# Patient Record
Sex: FEMALE | Race: WHITE | NOT HISPANIC OR LATINO | Employment: UNEMPLOYED | URBAN - METROPOLITAN AREA
[De-identification: names, ages, dates, MRNs, and addresses within clinical notes are randomized per-mention and may not be internally consistent; named-entity substitution may affect disease eponyms.]

---

## 2017-03-31 ENCOUNTER — OFFICE VISIT (OUTPATIENT)
Dept: URGENT CARE | Facility: PHYSICIAN GROUP | Age: 37
End: 2017-03-31
Payer: COMMERCIAL

## 2017-03-31 VITALS
RESPIRATION RATE: 20 BRPM | HEART RATE: 101 BPM | SYSTOLIC BLOOD PRESSURE: 130 MMHG | WEIGHT: 293 LBS | TEMPERATURE: 98.2 F | BODY MASS INDEX: 48.82 KG/M2 | DIASTOLIC BLOOD PRESSURE: 94 MMHG | OXYGEN SATURATION: 94 % | HEIGHT: 65 IN

## 2017-03-31 DIAGNOSIS — S16.1XXA NECK MUSCLE STRAIN, INITIAL ENCOUNTER: ICD-10-CM

## 2017-03-31 DIAGNOSIS — R09.81 NASAL SINUS CONGESTION: ICD-10-CM

## 2017-03-31 DIAGNOSIS — G44.209 ACUTE NON INTRACTABLE TENSION-TYPE HEADACHE: ICD-10-CM

## 2017-03-31 PROCEDURE — 99214 OFFICE O/P EST MOD 30 MIN: CPT | Performed by: NURSE PRACTITIONER

## 2017-03-31 RX ORDER — TIZANIDINE 4 MG/1
4 TABLET ORAL NIGHTLY PRN
Qty: 7 TAB | Refills: 0 | Status: SHIPPED | OUTPATIENT
Start: 2017-03-31 | End: 2019-03-01

## 2017-03-31 RX ORDER — KETOROLAC TROMETHAMINE 30 MG/ML
60 INJECTION, SOLUTION INTRAMUSCULAR; INTRAVENOUS ONCE
Status: COMPLETED | OUTPATIENT
Start: 2017-03-31 | End: 2017-03-31

## 2017-03-31 RX ORDER — FLUTICASONE PROPIONATE 50 MCG
2 SPRAY, SUSPENSION (ML) NASAL DAILY
Qty: 1 BOTTLE | Refills: 0 | Status: SHIPPED | OUTPATIENT
Start: 2017-03-31 | End: 2019-03-01

## 2017-03-31 RX ORDER — IBUPROFEN 600 MG/1
600 TABLET ORAL EVERY 6 HOURS PRN
Qty: 30 TAB | Refills: 0 | Status: SHIPPED | OUTPATIENT
Start: 2017-03-31

## 2017-03-31 RX ADMIN — KETOROLAC TROMETHAMINE 60 MG: 30 INJECTION, SOLUTION INTRAMUSCULAR; INTRAVENOUS at 13:55

## 2017-03-31 ASSESSMENT — ENCOUNTER SYMPTOMS
EYE REDNESS: 0
COUGH: 0
VOMITING: 0
CONSTIPATION: 0
ABDOMINAL PAIN: 0
BACK PAIN: 0
SORE THROAT: 0
HEADACHES: 1
SHORTNESS OF BREATH: 0
WEAKNESS: 0
EYE PAIN: 0
DIZZINESS: 1
TINGLING: 0
BLURRED VISION: 0
ORTHOPNEA: 0
NECK PAIN: 1
FOCAL WEAKNESS: 0
EYE DISCHARGE: 0
SENSORY CHANGE: 0
MYALGIAS: 1
PHOTOPHOBIA: 1
CHILLS: 0
NAUSEA: 1
SEIZURES: 0
DOUBLE VISION: 0
FALLS: 0
FEVER: 1
SPEECH CHANGE: 0
DIARRHEA: 0

## 2017-03-31 NOTE — MR AVS SNAPSHOT
"        Margy Bhagat   3/31/2017 1:00 PM   Office Visit   MRN: [id number]    Department:  [facility]   Dept Phone:  [phone]    Description:  Female : 1980   Provider:  LETI Kirkpatrick           Reason for Visit     Headache fever, loss of appetite,Pain above eyes, 4 days       Allergies as of 3/31/2017     No Known Allergies      You were diagnosed with     Neck muscle strain, initial encounter   [792810]       Acute non intractable tension-type headache   [0333302]       Nasal sinus congestion   [298881]         Vital Signs     Blood Pressure Pulse Temperature Respirations Height Weight    130/94 mmHg 101 36.8 °C (98.2 °F) 20 1.651 m (5' 5\") 141.522 kg (312 lb)    Body Mass Index Oxygen Saturation Smoking Status             51.92 kg/m2 94% Former Smoker         Basic Information     Date Of Birth Sex Race Ethnicity Preferred Language    1980 Female White Non- English      Problem List              ICD-10-CM Priority Class Noted - Resolved    Pain R52   2015 - Present    Edema of leg R60.0   2015 - Present    Morbid obesity with BMI of 45.0-49.9, adult (CMS-HCC) E66.01, Z68.42   2015 - Present    History of detached retina repair Z98.890, Z86.69   2015 - Present      Health Maintenance        Date Due Completion Dates    PAP SMEAR 2000 ---    IMM INFLUENZA (1) 2016 ---    IMM DTaP/Tdap/Td Vaccine (2 - Td) 2025            Current Immunizations     Tdap Vaccine 2015      Below and/or attached are the medications your provider expects you to take. Review all of your home medications and newly ordered medications with your provider and/or pharmacist. Follow medication instructions as directed by your provider and/or pharmacist. Please keep your medication list with you and share with your provider. Update the information when medications are discontinued, doses are changed, or new medications (including over-the-counter " products) are added; and carry medication information at all times in the event of emergency situations     Allergies:  No Known Allergies          Medications  Valid as of: March 31, 2017 -  1:55 PM    Generic Name Brand Name Tablet Size Instructions for use    Clobetasol Propionate (Ointment) TEMOVATE 0.05 % Apply 1 Application to affected area(s) 2 times a day.        Fluticasone Propionate (Suspension) FLONASE 50 MCG/ACT Spray 2 Sprays in nose every day.        Green Tea (Camillia sinensis)   Take  by mouth.        Ibuprofen   Take 500 mg by mouth.        Ibuprofen (Tab) MOTRIN 600 MG Take 1 Tab by mouth every 6 hours as needed.        Multiple Vitamins-Minerals   Take  by mouth.        TiZANidine HCl (Tab) ZANAFLEX 4 MG Take 1 Tab by mouth at bedtime as needed. Causes drowsiness, do not drive or work while using        .                 Medicines prescribed today were sent to:     [facility] [address] [place] [address], [place] - [address] [address]    Phone: [phone] Fax: [phone]    Open 24 Hours?: No      Medication refill instructions:       If your prescription bottle indicates you have medication refills left, it is not necessary to call your provider’s office. Please contact your pharmacy and they will refill your medication.    If your prescription bottle indicates you do not have any refills left, you may request refills at any time through one of the following ways: The online [employer] system (except Urgent Care), by calling your provider’s office, or by asking your pharmacy to contact your provider’s office with a refill request. Medication refills are processed only during regular business hours and may not be available until the next business day. Your provider may request additional information or to have a follow-up visit with you prior to refilling your medication.   *Please Note: Medication refills are assigned a new Rx number when refilled electronically.  Your pharmacy may indicate that no refills were authorized even though a new prescription for the same medication is available at the pharmacy. Please request the medicine by name with the pharmacy before contacting your provider for a refill.           Long Play Access Code: [email or web address]  Expires: 4/30/2017  1:55 PM    Long Play  A secure, online tool to manage your health information     [employer]’s Long Play® is a secure, online tool that connects you to your personalized health information from the privacy of your home -- day or night - making it very easy for you to manage your healthcare. Once the activation process is completed, you can even access your medical information using the Long Play tone, which is available for free in the Apple Tone store or Google Play store.     Long Play provides the following levels of access (as shown below):   My Chart Features   [facility]  Specialists [facility]  Urgent  Care [facility]  Primary Care  Doctor   Email your healthcare team securely and privately 24/7 X X X    Manage appointments: schedule your next appointment; view details of past/upcoming appointments X      Request prescription refills. X      View recent personal medical records, including lab and immunizations X X X X   View health record, including health history, allergies, medications X X X X   Read reports about your outpatient visits, procedures, consult and ER notes X X X X   See your discharge summary, which is a recap of your hospital and/or ER visit that includes your diagnosis, lab results, and care plan. X X       How to register for Long Play:  1. Go to  [email or web address].  2. Click on the Sign Up Now box, which takes you to the New Member Sign Up page. You will need to provide the following information:  a. Enter your Long Play Access Code exactly as it appears at the top of this page. (You will not need to use this code after you’ve completed the sign-up process. If you  do not sign up before the expiration date, you must request a new code.)   b. Enter your date of birth.   c. Enter your home email address.   d. Click Submit, and follow the next screen’s instructions.  3. Create a Tripl ID. This will be your Tripl login ID and cannot be changed, so think of one that is secure and easy to remember.  4. Create a Tripl password. You can change your password at any time.  5. Enter your Password Reset Question and Answer. This can be used at a later time if you forget your password.   6. Enter your e-mail address. This allows you to receive e-mail notifications when new information is available in Tripl.  7. Click Sign Up. You can now view your health information.    For assistance activating your Tripl account, call [phone]

## 2017-03-31 NOTE — PROGRESS NOTES
"Subjective:      Margy Bhagat is a 36 y.o. female who presents with Headache            Headache   Associated symptoms include dizziness, ear pain, a fever, nausea, neck pain and photophobia. Pertinent negatives include no abdominal pain, back pain, blurred vision, coughing, eye pain, eye redness, seizures, sore throat, tingling, vomiting or weakness.   Margy is a 36 year old female who is here for headache and neck pain. C/o pain with light sensitivity and nausea with HA. Denies migraines, seizures, changes in meds/foods. States had URI symptoms last week. Took Tylenol 2 tabs last night. Fever 101.3 degrees, last night; 99.2 degrees this AM. Feels slightly \"out of balance\". Denies confusion, trauma, injury or fall. Decreased appetite. Admits to poor water drinking. Ex-smoker. Fatigue. Clark starts at base of neck and ends behind eyes/temples. 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    PMH:  has a past medical history of Detached retina.  MEDS:   Current outpatient prescriptions:   •  IBUPROFEN PO, Take 500 mg by mouth., Disp: , Rfl:   •  fluticasone (FLONASE) 50 MCG/ACT nasal spray, Spray 2 Sprays in nose every day., Disp: 1 Bottle, Rfl: 0  •  tizanidine (ZANAFLEX) 4 MG Tab, Take 1 Tab by mouth at bedtime as needed. Causes drowsiness, do not drive or work while using, Disp: 7 Tab, Rfl: 0  •  ibuprofen (MOTRIN) 600 MG Tab, Take 1 Tab by mouth every 6 hours as needed., Disp: 30 Tab, Rfl: 0  •  Multiple Vitamins-Minerals (MULTIVITAMIN PO), Take  by mouth., Disp: , Rfl:   •  Green Tea, Camillia sinensis, (GREEN TEA EXTRACT PO), Take  by mouth., Disp: , Rfl:   •  clobetasol (TEMOVATE) 0.05 % OINT, Apply 1 Application to affected area(s) 2 times a day., Disp: 60 g, Rfl: 0  ALLERGIES: No Known Allergies  SURGHX:   Past Surgical History   Procedure Laterality Date   • Eye surgery       detatched retina   • Other       E Sure procedure     SOCHX:  reports that she quit smoking about 3 years ago. Her smoking " "use included Cigarettes. She has a .125 pack-year smoking history. She has never used smokeless tobacco. She reports that she drinks about 1.0 oz of alcohol per week. She reports that she does not use illicit drugs.  FH: Family history was reviewed, no pertinent findings to report      Review of Systems   Constitutional: Positive for fever and malaise/fatigue. Negative for chills.   HENT: Positive for ear pain. Negative for congestion and sore throat.    Eyes: Positive for photophobia. Negative for blurred vision, double vision, pain, discharge and redness.   Respiratory: Negative for cough and shortness of breath.    Cardiovascular: Negative for chest pain and orthopnea.   Gastrointestinal: Positive for nausea. Negative for vomiting, abdominal pain, diarrhea and constipation.   Genitourinary: Negative for dysuria.   Musculoskeletal: Positive for myalgias and neck pain. Negative for back pain and falls.   Skin: Negative for itching and rash.   Neurological: Positive for dizziness and headaches. Negative for tingling, sensory change, speech change, focal weakness, seizures and weakness.   Endo/Heme/Allergies: Negative for environmental allergies.   All other systems reviewed and are negative.         Objective:     /94 mmHg  Pulse 101  Temp(Src) 36.8 °C (98.2 °F)  Resp 20  Ht 1.651 m (5' 5\")  Wt 141.522 kg (312 lb)  BMI 51.92 kg/m2  SpO2 94%     Physical Exam   Constitutional: She is oriented to person, place, and time. Vital signs are normal. She appears well-developed and well-nourished.  Non-toxic appearance. She does not have a sickly appearance. She does not appear ill. No distress.   HENT:   Head: Normocephalic.   Right Ear: External ear and ear canal normal. Tympanic membrane is bulging. A middle ear effusion is present.   Left Ear: External ear and ear canal normal. Tympanic membrane is bulging. A middle ear effusion is present.   Nose: Mucosal edema present. No rhinorrhea or sinus tenderness. "   Mouth/Throat: Uvula is midline and oropharynx is clear and moist. Mucous membranes are dry. No uvula swelling.   Eyes: Conjunctivae and EOM are normal. Pupils are equal, round, and reactive to light.   Neck: Normal range of motion. Neck supple.   Cardiovascular: Normal rate and regular rhythm.    Pulmonary/Chest: Effort normal and breath sounds normal. No respiratory distress. She has no decreased breath sounds. She has no wheezes. She has no rhonchi. She has no rales.   Musculoskeletal: Normal range of motion.   Lymphadenopathy:     She has no cervical adenopathy.   Neurological: She is alert and oriented to person, place, and time. She has normal strength. No cranial nerve deficit or sensory deficit. Coordination and gait normal. GCS eye subscore is 4. GCS verbal subscore is 5. GCS motor subscore is 6.   Skin: Skin is warm and dry. She is not diaphoretic.   Psychiatric: She has a normal mood and affect. Her speech is normal. Judgment and thought content normal. She is not actively hallucinating. Cognition and memory are normal. She is attentive.   Vitals reviewed.              Assessment/Plan:     1. Neck muscle strain, initial encounter    - ketorolac (TORADOL) injection 60 mg; 2 mL by Intramuscular route Once.  - tizanidine (ZANAFLEX) 4 MG Tab; Take 1 Tab by mouth at bedtime as needed. Causes drowsiness, do not drive or work while using  Dispense: 7 Tab; Refill: 0  - ibuprofen (MOTRIN) 600 MG Tab; Take 1 Tab by mouth every 6 hours as needed.  Dispense: 30 Tab; Refill: 0    2. Acute non intractable tension-type headache    - ketorolac (TORADOL) injection 60 mg; 2 mL by Intramuscular route Once.  - ibuprofen (MOTRIN) 600 MG Tab; Take 1 Tab by mouth every 6 hours as needed.  Dispense: 30 Tab; Refill: 0    3. Nasal sinus congestion    - fluticasone (FLONASE) 50 MCG/ACT nasal spray; Spray 2 Sprays in nose every day.  Dispense: 1 Bottle; Refill: 0    Take Ibuprofen prn for discomfort  May use cool compresses for  swelling and warm compresses for muscle stiffness   May perform muscle stretches as tolerated after warm compresses to maintain mobility, avoid abdominal crunches  May use muscle relaxant prn when at home only   May apply topical analgesics prn  Perform proper body mechanics with lifting, twisting, bending and reaching. Ask for assistance with heavy objects  Monitor for numbness/tingling in upper extremities, decreased ROM with lifting difficulty- need re-evaluation    Avoid headache triggers like excessive caffeine, high sugar intake, high salty foods, alcohol, stress or lack of sleep  Increase water intake  Treat headache symptoms early to prevent severity, rest in dark cool place without distraction or noise  Monitor for HA not being relieved with treatment, continued vomiting, weakness, change in mental status- call 911    Increase water intake  Get rest  May take longer acting antihistamine for seasonal allergy symptoms prn  May use saline nasal spray for nasal congestion  May use Flonase or Nasocort for allergen nasal congestion  May gargle with salt water prn for throat discomfort  Monitor for productive cough, SOB and chest pain/tightness, thick nasal d/c, sore throat- need re-evaluation

## 2019-01-24 ENCOUNTER — OFFICE VISIT (OUTPATIENT)
Dept: URGENT CARE | Facility: PHYSICIAN GROUP | Age: 39
End: 2019-01-24
Payer: COMMERCIAL

## 2019-01-24 VITALS
BODY MASS INDEX: 48.82 KG/M2 | OXYGEN SATURATION: 96 % | TEMPERATURE: 98.7 F | HEIGHT: 65 IN | HEART RATE: 84 BPM | SYSTOLIC BLOOD PRESSURE: 126 MMHG | DIASTOLIC BLOOD PRESSURE: 76 MMHG | RESPIRATION RATE: 16 BRPM | WEIGHT: 293 LBS

## 2019-01-24 DIAGNOSIS — K04.7 DENTAL ABSCESS: ICD-10-CM

## 2019-01-24 PROCEDURE — 99214 OFFICE O/P EST MOD 30 MIN: CPT | Performed by: PHYSICIAN ASSISTANT

## 2019-01-24 RX ORDER — AMOXICILLIN AND CLAVULANATE POTASSIUM 875; 125 MG/1; MG/1
1 TABLET, FILM COATED ORAL 2 TIMES DAILY
Qty: 14 TAB | Refills: 0 | Status: SHIPPED | OUTPATIENT
Start: 2019-01-24 | End: 2019-01-31

## 2019-01-24 ASSESSMENT — ENCOUNTER SYMPTOMS
SINUS PRESSURE: 0
FEVER: 0
VOMITING: 0
DIZZINESS: 0
MYALGIAS: 0
EYE PAIN: 0
BLURRED VISION: 0
CHILLS: 0
HEADACHES: 0
DIARRHEA: 0
SHORTNESS OF BREATH: 0
SORE THROAT: 0
COUGH: 0
NAUSEA: 0
ABDOMINAL PAIN: 0
CONSTIPATION: 0
PALPITATIONS: 0

## 2019-01-24 NOTE — PROGRESS NOTES
Subjective:      Margy Bhagat is a 38 y.o. female who presents with Tooth Ache (Rt side, swollen x 3 days )      Dental Pain    This is a recurrent problem. The current episode started in the past 7 days (3 days ago). The problem occurs constantly. The problem has been gradually worsening. The pain is moderate. Pertinent negatives include no difficulty swallowing, facial pain, fever, oral bleeding, sinus pressure or thermal sensitivity. Associated symptoms comments: Swelling of right cheek. Treatments tried: Patient was given a course of Augmentin approximately 1 month ago which she took.  She had resolution of her symptoms for a few weeks until 3 days ago when it started to be painful again.   She reports that she has an appointment set up next week to see her dentist.      Review of Systems   Constitutional: Negative for chills and fever.   HENT: Negative for congestion, sinus pressure and sore throat.         Dental pain   Eyes: Negative for blurred vision and pain.   Respiratory: Negative for cough and shortness of breath.    Cardiovascular: Negative for chest pain and palpitations.   Gastrointestinal: Negative for abdominal pain, constipation, diarrhea, nausea and vomiting.   Musculoskeletal: Negative for myalgias.   Neurological: Negative for dizziness and headaches.       PMH:  has a past medical history of Detached retina.  MEDS:   Current Outpatient Prescriptions:   •  amoxicillin-clavulanate (AUGMENTIN) 875-125 MG Tab, Take 1 Tab by mouth 2 times a day for 7 days., Disp: 14 Tab, Rfl: 0  •  IBUPROFEN PO, Take 500 mg by mouth., Disp: , Rfl:   •  Multiple Vitamins-Minerals (MULTIVITAMIN PO), Take  by mouth., Disp: , Rfl:   •  fluticasone (FLONASE) 50 MCG/ACT nasal spray, Spray 2 Sprays in nose every day., Disp: 1 Bottle, Rfl: 0  •  tizanidine (ZANAFLEX) 4 MG Tab, Take 1 Tab by mouth at bedtime as needed. Causes drowsiness, do not drive or work while using, Disp: 7 Tab, Rfl: 0  •  ibuprofen (MOTRIN) 600  "MG Tab, Take 1 Tab by mouth every 6 hours as needed., Disp: 30 Tab, Rfl: 0  •  Green Tea, Camillia sinensis, (GREEN TEA EXTRACT PO), Take  by mouth., Disp: , Rfl:   •  clobetasol (TEMOVATE) 0.05 % OINT, Apply 1 Application to affected area(s) 2 times a day., Disp: 60 g, Rfl: 0  ALLERGIES: No Known Allergies  SURGHX:   Past Surgical History:   Procedure Laterality Date   • EYE SURGERY      detatched retina   • OTHER      E Sure procedure     SOCHX:  reports that she quit smoking about 5 years ago. Her smoking use included Cigarettes. She has a 0.12 pack-year smoking history. She has never used smokeless tobacco. She reports that she drinks about 1.0 oz of alcohol per week . She reports that she does not use drugs.  FH: Family history was reviewed, no pertinent findings to report     Objective:     /76   Pulse 84   Temp 37.1 °C (98.7 °F)   Resp 16   Ht 1.651 m (5' 5\")   Wt (!) 151.5 kg (334 lb)   SpO2 96%   BMI 55.58 kg/m²      Physical Exam   Constitutional: She is oriented to person, place, and time. She appears well-developed and well-nourished.   HENT:   Head: Normocephalic and atraumatic.   Right Ear: External ear normal.   Left Ear: External ear normal.   Mouth/Throat: Uvula is midline, oropharynx is clear and moist and mucous membranes are normal. Abnormal dentition. Dental abscesses and dental caries present.       Eyes: Pupils are equal, round, and reactive to light. Conjunctivae are normal.   Neck: Normal range of motion.   Cardiovascular: Normal rate, regular rhythm and normal heart sounds.    No murmur heard.  Pulmonary/Chest: Effort normal and breath sounds normal. She has no wheezes.   Lymphadenopathy:     She has no cervical adenopathy.   Neurological: She is alert and oriented to person, place, and time.   Skin: Skin is warm and dry. Capillary refill takes less than 2 seconds.   Psychiatric: She has a normal mood and affect. Her behavior is normal. Judgment normal.          "   Assessment/Plan:     1. Dental abscess  - amoxicillin-clavulanate (AUGMENTIN) 875-125 MG Tab; Take 1 Tab by mouth 2 times a day for 7 days.  Dispense: 14 Tab; Refill: 0  -Apply warm compresses to right cheek multiple times per day  -Patient has an appointment with her dentist scheduled for next week      Differential Diagnosis, natural history, and supportive care discussed. Return to the Urgent Care or follow up with your PCP if symptoms fail to resolve, or for any new or worsening symptoms. Emergency room precautions discussed. Patient and/or family appears understanding of information.

## 2019-03-01 ENCOUNTER — OFFICE VISIT (OUTPATIENT)
Dept: MEDICAL GROUP | Facility: PHYSICIAN GROUP | Age: 39
End: 2019-03-01
Payer: COMMERCIAL

## 2019-03-01 VITALS
HEART RATE: 90 BPM | DIASTOLIC BLOOD PRESSURE: 78 MMHG | WEIGHT: 293 LBS | TEMPERATURE: 98.8 F | HEIGHT: 65 IN | OXYGEN SATURATION: 98 % | SYSTOLIC BLOOD PRESSURE: 132 MMHG | BODY MASS INDEX: 48.82 KG/M2

## 2019-03-01 DIAGNOSIS — I10 ESSENTIAL HYPERTENSION: ICD-10-CM

## 2019-03-01 DIAGNOSIS — E66.01 MORBID OBESITY WITH BMI OF 50.0-59.9, ADULT (HCC): ICD-10-CM

## 2019-03-01 DIAGNOSIS — Z00.00 PREVENTATIVE HEALTH CARE: ICD-10-CM

## 2019-03-01 PROBLEM — R60.0 LEG EDEMA, LEFT: Status: ACTIVE | Noted: 2019-03-01

## 2019-03-01 PROCEDURE — 99214 OFFICE O/P EST MOD 30 MIN: CPT | Performed by: FAMILY MEDICINE

## 2019-03-01 RX ORDER — METOPROLOL SUCCINATE 50 MG/1
50 TABLET, EXTENDED RELEASE ORAL DAILY
Qty: 30 TAB | Refills: 2 | Status: SHIPPED | OUTPATIENT
Start: 2019-03-01 | End: 2019-05-24 | Stop reason: SDUPTHER

## 2019-03-01 ASSESSMENT — PATIENT HEALTH QUESTIONNAIRE - PHQ9: CLINICAL INTERPRETATION OF PHQ2 SCORE: 0

## 2019-03-01 NOTE — PROGRESS NOTES
CC: Elevated blood pressure    HISTORY OF THE PRESENT ILLNESS: Patient is a 38 y.o. female. This pleasant patient is here today to establish care and discuss health problems as below.    Elevated blood pressure: Patient recently with tooth infection and in need of her root canal.  This all started a couple of months ago.  She is been on 3 rounds of antibiotics now.  She is attempted to go to the dentist twice now for root canal but both times her blood pressures been elevated.  The last time she was there her blood pressure was 191/130 as the highest reading.  These blood pressure readings were always taken with either a wrist or arm blood pressure cuff which were automatic.  She reports being nervous about this procedure to the point of shaking when her blood pressure was that elevated.  Otherwise she is never been diagnosed with high blood pressure before and her blood pressures always been normal in office visits.  She has no signs of hypertension or cardiac disease.  She denies chest pain, headache, blurry vision, syncope, presyncope, dizziness, shortness of breath.  She does have chronic left lower extremity edema due to a severe leg infection but otherwise has no lower extremity edema either.    Obesity: Chronic issue for the patient.  It does run in her family.  She is trying to work out more she has felt better in the past when working out.  She is wondering what kind of food she should be eating for both her blood pressure and her weight.  She does admit to eating portion sizes which are likely more than what she needs.    Allergies: Patient has no known allergies.    Current Outpatient Prescriptions Ordered in [facility]   Medication Sig Dispense Refill   • Calcium Polycarbophil (FIBER-CAPS PO) Take  by mouth.     • metoprolol SR (TOPROL XL) 50 MG TABLET SR 24 HR Take 1 Tab by mouth every day. 30 Tab 2   • Multiple Vitamins-Minerals (MULTIVITAMIN PO) Take  by mouth.     • IBUPROFEN PO Take 500 mg by mouth.     •  "ibuprofen (MOTRIN) 600 MG Tab Take 1 Tab by mouth every 6 hours as needed. 30 Tab 0   • Green Tea, Camillia sinensis, (GREEN TEA EXTRACT PO) Take  by mouth.       No current Epic-ordered facility-administered medications on file.        Past Medical History:   Diagnosis Date   • Detached retina     left, regained some vision       Past Surgical History:   Procedure Laterality Date   • EYE SURGERY      detatched retina   • OTHER      E Sure procedure       Social History   Substance Use Topics   • Smoking status: Former Smoker     Packs/day: 0.25     Years: 0.50     Types: Cigarettes     Quit date: 1/1/2014   • Smokeless tobacco: Never Used   • Alcohol use 1.0 oz/week     2 Glasses of wine per week       Social History     Social History Narrative   • No narrative on file       Family History   Problem Relation Age of Onset   • Diabetes Mother    • Lung Disease Father         copd   • Heart Disease Father         chf   • Hypertension Father    • Cancer Paternal Grandfather         lung   • Diabetes Maternal Aunt    • Hyperlipidemia Neg Hx    • Stroke Neg Hx    • Alcohol/Drug Neg Hx        ROS:     - Constitutional: Negative for fever, chills, unexpected weight change, and fatigue/generalized weakness.     - HEENT: Negative for headaches, vision changes, hearing changes, ear pain, ear discharge, rhinorrhea, sinus congestion, sore throat, and neck pain.      - Respiratory: Negative for cough, sputum production, chest congestion, dyspnea, wheezing, and crackles.      - Cardiovascular: Positive for left lower extremity chronic edema.  Negative for chest pain, palpitations, orthopnea, PND, and bilateral lower extremity edema.     Exam: Blood pressure 132/78, pulse 90, temperature 37.1 °C (98.8 °F), temperature source Temporal, height 1.651 m (5' 5\"), weight (!) 152.9 kg (337 lb), SpO2 98 %, not currently breastfeeding. Body mass index is 56.08 kg/m².    General: Well appearing, NAD  Pulmonary: Clear to ausculation.  " Normal effort. No rales, ronchi, or wheezing.  Cardiovascular: Regular rate and rhythm without murmur, rubs or gallop.  Left lower extremity edema with dense nonpitting edema under compression hose.  Right lower extremity without edema.  Skin: Warm and dry.  No obvious lesions.  Musculoskeletal:  No extremity cyanosis, clubbing, or edema.  Psych: Normal mood and affect. Alert and oriented. Judgment and insight is normal.    Please note that this dictation was created using voice recognition software. I have made every reasonable attempt to correct obvious errors, but I expect that there are errors of grammar and possibly content that I did not discover before finalizing the note.      Assessment/Plan  Margy was seen today for other.    Diagnoses and all orders for this visit:    Preventative health care  Preventative labs ordered as below.  Patient does need these labs drawn for biometric screen for work and will bring paperwork in.  -     Comp Metabolic Panel; Future  -     HEMOGLOBIN A1C; Future  -     Lipid Profile; Future    Morbid obesity with BMI of 50.0-59.9, adult (HCC)  Chronic uncontrolled problem for the patient.  Check thyroid labs today.  Discussed diet and exercise as well as lifestyle changes today.  -     TSH WITH REFLEX TO FT4; Future    Essential hypertension  New problem for the patient.  Suspect blood pressure highly elevated in dentist office due to both anxiety related to procedure as well as inaccurate readings with automatic blood pressure cuff.  She has always been within the normal to mildly elevated range during her last 3 office visits dating back to last year as well.  Today her blood pressure is mildly elevated at 132/78.  We discussed whether or not to start a blood pressure medicine and patient is agreeable to start low-dose metoprolol at this time.  She will come in about 4-5 days for blood pressure recheck and then I will go ahead and sign the medical release form so that she can  proceed with the root canal.  From a blood pressure and cardiac standpoint she is asymptomatic.  -     metoprolol SR (TOPROL XL) 50 MG TABLET SR 24 HR; Take 1 Tab by mouth every day.      Follow-up in about 4-5 days for blood pressure recheck.    Brittni Kennedy DO  [facility]

## 2019-03-06 ENCOUNTER — NON-PROVIDER VISIT (OUTPATIENT)
Dept: MEDICAL GROUP | Facility: PHYSICIAN GROUP | Age: 39
End: 2019-03-06
Payer: COMMERCIAL

## 2019-03-06 VITALS — DIASTOLIC BLOOD PRESSURE: 90 MMHG | SYSTOLIC BLOOD PRESSURE: 132 MMHG

## 2019-03-06 NOTE — PROGRESS NOTES
Blood pressure reviewed.  We will go ahead and increase metoprolol to 100 mg daily as opposed to 50 mg daily.  She will come back in about 5 days for blood pressure recheck.  If blood pressure appropriate at that time, we will go ahead and fill out paperwork for dental clearance.    Brittni Kennedy, DO  11:46 AM    Margy Bhagat is a 38 y.o. female here for a non-provider visit for BP check     Vitals:    03/06/19 1134 03/06/19 1135   BP: 140/90 132/90   BP Location: Left arm Left arm   Patient Position: Sitting Sitting   BP Cuff Size: Adult long Adult long     If abnormal, was an in office provider notified today? Yes  Routed to PCP? Yes

## 2019-03-11 ENCOUNTER — NON-PROVIDER VISIT (OUTPATIENT)
Dept: MEDICAL GROUP | Facility: PHYSICIAN GROUP | Age: 39
End: 2019-03-11
Payer: COMMERCIAL

## 2019-03-11 VITALS — DIASTOLIC BLOOD PRESSURE: 90 MMHG | SYSTOLIC BLOOD PRESSURE: 132 MMHG

## 2019-03-11 NOTE — NON-PROVIDER
Margy Bhagat is a 38 y.o. female here for a non-provider visit for BP check.    Vitals:    03/11/19 1312   BP: 132/90   BP Location: Left arm     If abnormal, was an in office provider notified today? Yes  Routed to PCP? No

## 2019-05-24 DIAGNOSIS — I10 ESSENTIAL HYPERTENSION: ICD-10-CM

## 2019-05-25 RX ORDER — METOPROLOL SUCCINATE 50 MG/1
50 TABLET, EXTENDED RELEASE ORAL DAILY
Qty: 90 TAB | Refills: 1 | Status: SHIPPED | OUTPATIENT
Start: 2019-05-25 | End: 2019-08-27 | Stop reason: SDUPTHER

## 2019-05-25 NOTE — TELEPHONE ENCOUNTER
Refill X 6 months, sent to pharmacy.Pt. Seen in the last 6 months per protocol.   Lab Results   Component Value Date/Time    SODIUM 136 07/22/2015 06:22 AM    POTASSIUM 3.4 (L) 07/22/2015 06:22 AM    CHLORIDE 102 07/22/2015 06:22 AM    CO2 26 07/22/2015 06:22 AM    GLUCOSE 108 (H) 07/22/2015 06:22 AM    BUN 5 (L) 07/22/2015 06:22 AM    CREATININE 0.59 07/22/2015 06:22 AM    CREATININE 0.7 07/28/2007 02:00 PM

## 2019-10-24 DIAGNOSIS — I10 ESSENTIAL HYPERTENSION: ICD-10-CM

## 2019-10-28 RX ORDER — METOPROLOL SUCCINATE 50 MG/1
TABLET, EXTENDED RELEASE ORAL
Qty: 90 TAB | Refills: 0 | Status: SHIPPED | OUTPATIENT
Start: 2019-10-28 | End: 2020-01-21

## 2025-03-13 ENCOUNTER — OFFICE VISIT (OUTPATIENT)
Dept: URGENT CARE | Facility: PHYSICIAN GROUP | Age: 45
End: 2025-03-13
Payer: MEDICAID

## 2025-03-13 VITALS
RESPIRATION RATE: 18 BRPM | OXYGEN SATURATION: 97 % | HEART RATE: 100 BPM | TEMPERATURE: 98.6 F | SYSTOLIC BLOOD PRESSURE: 128 MMHG | WEIGHT: 293 LBS | HEIGHT: 66 IN | DIASTOLIC BLOOD PRESSURE: 86 MMHG | BODY MASS INDEX: 47.09 KG/M2

## 2025-03-13 DIAGNOSIS — L03.116 CELLULITIS OF LEFT LOWER EXTREMITY: ICD-10-CM

## 2025-03-13 DIAGNOSIS — I89.0 LYMPHEDEMA: ICD-10-CM

## 2025-03-13 PROCEDURE — 3074F SYST BP LT 130 MM HG: CPT | Performed by: STUDENT IN AN ORGANIZED HEALTH CARE EDUCATION/TRAINING PROGRAM

## 2025-03-13 PROCEDURE — 3079F DIAST BP 80-89 MM HG: CPT | Performed by: STUDENT IN AN ORGANIZED HEALTH CARE EDUCATION/TRAINING PROGRAM

## 2025-03-13 PROCEDURE — 99204 OFFICE O/P NEW MOD 45 MIN: CPT | Performed by: STUDENT IN AN ORGANIZED HEALTH CARE EDUCATION/TRAINING PROGRAM

## 2025-03-13 RX ORDER — CEPHALEXIN 500 MG/1
500 CAPSULE ORAL 4 TIMES DAILY
Qty: 28 CAPSULE | Refills: 0 | Status: SHIPPED | OUTPATIENT
Start: 2025-03-13 | End: 2025-03-20

## 2025-03-14 NOTE — PROGRESS NOTES
Subjective:   Margy Bhagat is a 44 y.o. female who presents for Leg Problem (Dry and itchy skin, cut L leg while shaving x 1 day )      HPI:  44-year-old female presents with left leg swelling and rash started approxim multiple years.  She says she ately 1 day ago.  She reports a history of chronic left leg swelling for the past nicked her leg while shaving approximate 1 day ago when she woke up this morning there is severe redness surrounding the site of the small scratch.  No significant discharge from the wound.  She does not wear any compression stockings on the left leg she does not take any medications for other chronic lymphedema and swelling.  She does report increased tenderness to palpation on the medial aspect of the left lower extremity where the neck is as well as the redness that is spreading.  She reports increased warmth to the area but denies any fevers or chills.    Review of Systems   Cardiovascular:  Positive for leg swelling.   Skin:  Positive for rash.       Medications:    cephALEXin Caps  FIBER-CAPS PO  GREEN TEA EXTRACT PO  IBUPROFEN PO  ibuprofen Tabs  metoprolol SR Tb24  MULTIVITAMIN PO    Allergies: Patient has no known allergies.    Problem List: Margy Bhagat does not have any pertinent problems on file.    Surgical History:  Past Surgical History:   Procedure Laterality Date    EYE SURGERY      detatched retina    OTHER      E Sure procedure       Past Social Hx: Margy Bhagat  reports that she quit smoking about 11 years ago. Her smoking use included cigarettes. She started smoking about 11 years ago. She has a 0.1 pack-year smoking history. She has never used smokeless tobacco. She reports current alcohol use of about 1.0 oz of alcohol per week. She reports that she does not use drugs.     Past Family Hx:  Margy Bhagat family history includes Cancer in her paternal grandfather; Diabetes in her maternal aunt and mother; Heart Disease in her father; Hypertension  "in her father; Lung Disease in her father.     Problem list, medications, and allergies reviewed by myself today in Epic.     Objective:     /86 (BP Location: Left arm, Patient Position: Sitting, BP Cuff Size: Adult)   Pulse 100   Temp 37 °C (98.6 °F) (Temporal)   Resp 18   Ht 1.676 m (5' 6\")   Wt (!) 156 kg (343 lb)   SpO2 97%   BMI 55.36 kg/m²     Physical Exam  Constitutional:       Appearance: Normal appearance. She is obese.   HENT:      Head: Normocephalic and atraumatic.   Cardiovascular:      Rate and Rhythm: Normal rate and regular rhythm.      Pulses: Normal pulses.      Heart sounds: Normal heart sounds.   Pulmonary:      Effort: Pulmonary effort is normal.      Breath sounds: Normal breath sounds.   Musculoskeletal:      Right lower leg: Normal.      Left lower leg: 3+ Pitting Edema present.        Legs:       Comments: Chronic venous stasis changes of the left lower extremity.  On the medial aspect of left calf significant area of erythema no purulence noted no discharge noted.  Mildly tender to palpation.   Neurological:      Mental Status: She is alert.         Assessment/Plan:     Diagnosis and associated orders:     1. Cellulitis of left lower extremity  cephALEXin (KEFLEX) 500 MG Cap    Referral to establish with PCP      2. Lymphedema  Referral to establish with PCP         Comments/MDM:     1. Cellulitis of left lower extremity  2. Lymphedema  Will treat as a cellulitis of the left lower extremity.  I discussed with patient to continue rest compression stockings and elevate leg when able to help with the lymphedema.  I recommend evaluation by primary care provider as soon as possible for further management of lymphedema.  - No purulence noted from the area of cellulitis.  Recommend antibiotics as below  - Return precaution discussed include significant worsening of the redness spreading up the side of her leg development of fever or chills development purulence at the site or " increased swelling of the left lower extremity.  - If any of these occur I recommend evaluation in the emergency department.  - cephALEXin (KEFLEX) 500 MG Cap; Take 1 Capsule by mouth 4 times a day for 7 days.  Dispense: 28 Capsule; Refill: 0  - Referral to establish with PCP                   Differential diagnosis, natural history, supportive care, and indications for immediate follow-up discussed.    Advised the patient to follow-up with the primary care physician for recheck, reevaluation, and consideration of further management.    Please note that this dictation was created using voice recognition software. I have made a reasonable attempt to correct obvious errors, but I expect that there are errors of grammar and possibly content that I did not discover before finalizing the note.    Alfa Amador M.D.

## 2025-08-16 ENCOUNTER — HOSPITAL ENCOUNTER (OUTPATIENT)
Facility: MEDICAL CENTER | Age: 45
End: 2025-08-16
Payer: MEDICAID

## 2025-08-16 ENCOUNTER — OFFICE VISIT (OUTPATIENT)
Dept: URGENT CARE | Facility: PHYSICIAN GROUP | Age: 45
End: 2025-08-16
Payer: MEDICAID

## 2025-08-16 VITALS
OXYGEN SATURATION: 97 % | TEMPERATURE: 97.6 F | HEIGHT: 66 IN | BODY MASS INDEX: 47.09 KG/M2 | SYSTOLIC BLOOD PRESSURE: 126 MMHG | DIASTOLIC BLOOD PRESSURE: 80 MMHG | HEART RATE: 103 BPM | WEIGHT: 293 LBS | RESPIRATION RATE: 16 BRPM

## 2025-08-16 DIAGNOSIS — E66.01 MORBID OBESITY WITH BMI OF 50.0-59.9, ADULT (HCC): ICD-10-CM

## 2025-08-16 DIAGNOSIS — S81.802A OPEN WOUND OF LEFT LOWER EXTREMITY, INITIAL ENCOUNTER: ICD-10-CM

## 2025-08-16 DIAGNOSIS — I87.8 VENOUS STASIS: ICD-10-CM

## 2025-08-16 DIAGNOSIS — S81.802A OPEN WOUND OF LEFT LOWER EXTREMITY, INITIAL ENCOUNTER: Primary | ICD-10-CM

## 2025-08-16 DIAGNOSIS — L03.116 CELLULITIS OF LEFT LOWER EXTREMITY: ICD-10-CM

## 2025-08-16 LAB
GRAM STN SPEC: NORMAL
SIGNIFICANT IND 70042: NORMAL
SITE SITE: NORMAL
SOURCE SOURCE: NORMAL

## 2025-08-16 PROCEDURE — 87205 SMEAR GRAM STAIN: CPT

## 2025-08-16 PROCEDURE — 87077 CULTURE AEROBIC IDENTIFY: CPT

## 2025-08-16 PROCEDURE — 87186 SC STD MICRODIL/AGAR DIL: CPT

## 2025-08-16 PROCEDURE — 90471 IMMUNIZATION ADMIN: CPT

## 2025-08-16 PROCEDURE — 87070 CULTURE OTHR SPECIMN AEROBIC: CPT

## 2025-08-16 PROCEDURE — 99214 OFFICE O/P EST MOD 30 MIN: CPT | Mod: 25

## 2025-08-16 PROCEDURE — 90715 TDAP VACCINE 7 YRS/> IM: CPT

## 2025-08-16 RX ORDER — SULFAMETHOXAZOLE AND TRIMETHOPRIM 800; 160 MG/1; MG/1
1 TABLET ORAL 2 TIMES DAILY
Qty: 10 TABLET | Refills: 0 | Status: SHIPPED | OUTPATIENT
Start: 2025-08-16 | End: 2025-08-21

## 2025-08-16 RX ORDER — CEPHALEXIN 500 MG/1
500 CAPSULE ORAL 4 TIMES DAILY
Qty: 20 CAPSULE | Refills: 0 | Status: SHIPPED | OUTPATIENT
Start: 2025-08-16 | End: 2025-08-21

## 2025-08-16 ASSESSMENT — ENCOUNTER SYMPTOMS
NAUSEA: 0
EYE DISCHARGE: 0
DIARRHEA: 0
FLANK PAIN: 0
SPUTUM PRODUCTION: 0
ABDOMINAL PAIN: 0
PALPITATIONS: 0
BACK PAIN: 0
HEADACHES: 0
SHORTNESS OF BREATH: 0
EYE REDNESS: 0
CHILLS: 1
SORE THROAT: 0
NERVOUS/ANXIOUS: 0
STRIDOR: 0
COUGH: 0
FEVER: 0
WHEEZING: 0
DIZZINESS: 0
WEAKNESS: 0
VOMITING: 0
MYALGIAS: 0
EYE PAIN: 0

## 2025-08-16 ASSESSMENT — PAIN SCALES - GENERAL: PAINLEVEL_OUTOF10: 3=SLIGHT PAIN

## 2025-08-18 LAB
BACTERIA WND AEROBE CULT: ABNORMAL
GRAM STN SPEC: ABNORMAL
SIGNIFICANT IND 70042: ABNORMAL
SITE SITE: ABNORMAL
SOURCE SOURCE: ABNORMAL